# Patient Record
(demographics unavailable — no encounter records)

---

## 2024-11-05 NOTE — DISCUSSION/SUMMARY
[Medication Risks Reviewed] : Medication risks reviewed [de-identified] : reviewed the case and the imaging with her - healed compression fracture - still pain  not a surgical canddiate questions answered   discussed the knee situation and the xray looks okay - she could get an MRI and see knee service if needed  discussion that the flare ups may continue still has pain  We discussed lido patches. She deferred refill today Tylenol taken at night for pain in order to sleep  consider Lumbar/thoracic facets injections versus LESi  she can f/u for a TPi if needed  referral to pain management still prn   encourged HEP, consider aqua therapy, chair yoga?  otherwise f/u in 2  months

## 2024-11-05 NOTE — HISTORY OF PRESENT ILLNESS
[Lower back] : lower back [8] : 8 [Burning] : burning [Dull/Aching] : dull/aching [Tightness] : tightness [Frequent] : frequent [Sitting] : sitting [Lying in bed] : lying in bed [de-identified] : 4/24/23: 71 yo f here for the evaluation of her low back; reports injury on 4/18/23 where she fell into a manhole and fell down a few feet into a manhole; seen at [facility] where xrays done. Pain is to the low back and can shoot down both the right and the left leg. Some tingling into the right foot. Bruising to the right buttock.  No weakness. No loss of b/b control. She has been using a walker.  bruises on the left side bruises on the arm  No prior spine injuries/surgeries She has been taking tylenol  Hx hypothyroid  She takes Eliquis as she had SVT after covid - has a loop monitor  NO hx diabetes/cancer Has had DEXA  had a partial thyroidectomy oophrectomy  She is retired  xrays L-spine 4/18/23 - no fractures noted xrays today AP pelvis - no fractures noted   xrays today: T spine - no obvious fracture  L ribs - no obvious fracture L spine - possilbe compression fracture? AP PELVIS - possilbe ishcial tubersity crack on the right?  5/12/23: Here to review MRI - plan at last was "REVIEWED The case/imaging with her - MRI L spine indicated for COMPRESSION FRACTURE AT l3 - MRI PELVIS INDICATED FOR PELVIC FRACTURE - no obvious rib fracture - AVOID bending/lifting - FU TO REVIEW THE mrI - cont with walker" - after SHE saw me last the pain got a lot worse - now getting a little bit better - still cant sit comfortably   Had a fracture in the right foot - now in a boot and she is supposed to stay off as much as possible   xrays today: T spine - no new fractures noted L spine - compression deformities  L ribs - possilble hairline  MRI pelvis:  1. Focal areas of marrow edema with mild superior endplate depression on the right at L2 and L4 suspicious for mild recent compression fractures.. 2. Findings consistent with a subcutaneous soft tissue strain inferior medial to the right ischial tuberosity over an area measuring 5-6 cm without well-defined hematoma or mass. Clinical correlation and follow up is recommended. 3. Nonspecific mild-to-moderate free fluid in the pelvis posteriorly. Consider ultrasound of the pelvis to further evaluate as clinically indicated. 4. Mild bilateral gluteal tendinopathy and slight bilateral greater trochanteric bursitis right greater than left. 5. Symphysis pubis arthrosis. 6. Degenerative changes in the lower lumbar spine and small perineural cysts in the upper-to-mid sacral foramina which appear nonaggressive measuring up to 2 cm.  MRI L spine: 1. Focal areas of marrow edema with mild superior endplate depression on the right at L2 and L4 suspicious for  mild recent compression fractures. 2. Left foraminal herniation encroaches upon the left exiting L3 nerve root at L3-L4, right paracentral protrusion encroaches upon the right S1 nerve root at L5-S1 and there is multilevel degenerative disc disease with protrusions centrally at L1-L2 and L2-L3. 3. Mild convexity of the lumbar spine towards the left. 4. Small perineural cysts in the upper-to-mid sacral foramina measuring up to 2 cm which appear nonaggressive.  6/9/23 Patient presents today in follow up with acute flare of low back pain and BLE radicular pain. Patient currently not taking medications at this time.  Patient recent wearing Cam boot now able to WBAT (now in ankle support) and utilize walker reporting increased pain.   Patient also with episode of neck pain and stiffness.   Has PT coming to   xrays today: C spine- loss of disc hieght with spondylosis at C4-5 T spine - spondylosis L spine - no change in compression fracture alignment   7/14/23 Patient presents today in follow up with Hx of lumbar fxr after fall in [place]. Continues to wear LSO at this time. continues to have pain in neck and back, taking Flexeril prn.  Repeat imaging performed today-XRay  In PT for the foot now - just started with that   xrays today: Cspine - C4-5 spondylolisthesis with spondylosis/loss of disc hieght  Tspine - spondylosis  Lspine -  no loss of disc heigth   8/31/23: Here for f/u - No significant relief with PT.  Still pain in low back, has not been radiating down legs.  had a flare up of pain in the back of the shoulder blade on the left side   using brace on the back  using lumbar pillow  has been driving a bit   Has seen Dr Gonzalez for the foot - had EMG/NCS WHICH was negative   xrays today: l spine - no collapse of the vertebral bodies noted  10/2/23: Here to follow up. Plan at last was to start PT and if no improvement get MRI. Pain continue with no improvement. Wears brace. Doing PT at home   MRi L spine - healed compression fractures   11/27/23: Here to folllow up back pain. Worsening in the lower back pain  was going to PT but the price was too much as she has used up the allocatoin feels balance is off  Having more pain in the back   xrays today: Tspine - spondylosis  Lspine -  no loss of disc heigth, no collapse of the bones   1/8/24: here to follow up on lumbar spine. Attending PT with slight improvement. Noticed radiating pain down left leg since last week.  The pain in the lower back pain remains bad  Had to stop PT DUE TO pain flare up  took some tylenol   2/23/24: f/up L spine. Plan at last was MDP for acute flare-up.  Has flare ups that are scary for her - States she had dental work done since last visit and she took the MDP after, but it had really upset her stomach. Pain remains. States it is intermittent but is bothersome daily. Having difficulty with sleeping due to pain.   7/15/24: Here to follow up back pain. Pain continues, worse with activity. No sig changes. PT helped with strength but did not help with pain.   seeing psych for PTSD  09/16/2024 Patient is following up on lower back. States she saw pain management.   Pain remains in the back Had a flare up  saw Dr Hernandez   xrays today: L knee - no severe OA, MILD pf NARROWING   11/04/24: pt is here for follow up on lower back, states she still has some pain, only has relief when laying in bed or couch. states she feels ''discouraged'' due to the pain. [] : no [FreeTextEntry5] : took MDP with side effects, discontinued- heartburn, gastro issues. continuing PT.  [de-identified] : physical therapy

## 2024-11-05 NOTE — PHYSICAL EXAM
[Left lower extremity below knee] : left lower extremity below knee [Left lower extremity above knee] : left lower extremity above knee [] : mildly antalgic [FreeTextEntry9] : slight improvement since prior visit

## 2024-11-05 NOTE — DISCUSSION/SUMMARY
[de-identified] : home exercises and low impact activity recommended. HEP encouraged f/u 2-3 months

## 2024-11-05 NOTE — DISCUSSION/SUMMARY
[Medication Risks Reviewed] : Medication risks reviewed [de-identified] : reviewed the case and the imaging with her - healed compression fracture - still pain  not a surgical canddiate questions answered   discussed the knee situation and the xray looks okay - she could get an MRI and see knee service if needed  discussion that the flare ups may continue still has pain  We discussed lido patches. She deferred refill today Tylenol taken at night for pain in order to sleep  consider Lumbar/thoracic facets injections versus LESi  she can f/u for a TPi if needed  referral to pain management still prn   encourged HEP, consider aqua therapy, chair yoga?  otherwise f/u in 2  months

## 2024-11-05 NOTE — HISTORY OF PRESENT ILLNESS
[Right Leg] : right leg [Sudden] : sudden [5] : 5 [2] : 2 [Dull/Aching] : dull/aching [Sharp] : sharp [Frequent] : frequent [Meds] : meds [de-identified] :  4/25/23: This is Ms. JOSE FRITZ  a 70 year old female who comes in today complaining of right ankle pain since 4/18/23 when she fell into a dysfunctional manhole in [place].  SHe is seeing Dr Vang for compression fx at L3 and possible pelvic fx.  Pain is to the lateral ankle with weightbearing.  No prior right ankle issues. Seen at [facility] where xrays done.  WBAT in brace. She is taking tylenol.   05/04/23: Pt is here to review MRI result. Pt tried to sleep without the brace, pain increase so she put the brace back on, felt better. Using walker 05/30/23: f/u R ankle cam boot/walker; NWB in CAM boot, feels much better  07/18/2023: Follow up on right ankle. Wb in airsport. states she has been having swelling for 2-3 weeks had a Doppler done on 6/21/23 which was negative for DVT.  She does not feel steady on her feet and notices fatigue and shaking of the ankle. Unable to swim/kick. Having heel pain. 08/10/2023: EMG Results. Toe mobility improved.   9/28/23: f/u R ankle.  She continues to wear airsport. She was not able to wean with pain.  She feels posterior heel pain, throbbing nature.  She is making slow gains with PT. RLE pain from back down.  RLE weakness. 10/31/23: FU Right ankle. Pain stayed the same since last visit. Pt states her gait is "choppy" .Saw Dr. Vang, he recommended her to go to a PT facility instead of home therapy.  Balance worse a night 12/05/23: follow up on right ankle. PT 1- 2 a week. Notes balance and her gait is still bad. She is still limping.  Occ sharp pain to the lateral ankle.  02/22/23: f/u right ankle. States 3 weeks ago in PT she was doing the bands and since than she is having pain to the lateral foot. 04/02/24: follow up on right ankle. States she would to discuss orthotics. Wb in sneakers.  05/21/2024 Patient is following up on right ankle. Pain has moved up in the ankle radiating up the leg. WB slides Having Spine issues 07/30/2024 Patient is following up on right ankle. States she gets pain after activities approx 10 minutes (bck issues). Wb in sneakers 09/17/2024: follow Up on right ankle. States pain/atigue increases with activities.  11/05/2024: follow up on right ankle. Pain continues to the lateral ankle despite HEP. Having back and knee issues. Tylenol as needed. PT caused more pain.  [] : no [FreeTextEntry1] : ankle [FreeTextEntry3] : 4/18/23 [FreeTextEntry9] : tylenol [de-identified] : ER in [place]

## 2024-11-05 NOTE — HISTORY OF PRESENT ILLNESS
[Lower back] : lower back [8] : 8 [Burning] : burning [Dull/Aching] : dull/aching [Tightness] : tightness [Frequent] : frequent [Sitting] : sitting [Lying in bed] : lying in bed [de-identified] : 4/24/23: 69 yo f here for the evaluation of her low back; reports injury on 4/18/23 where she fell into a manhole and fell down a few feet into a manhole; seen at [facility] where xrays done. Pain is to the low back and can shoot down both the right and the left leg. Some tingling into the right foot. Bruising to the right buttock.  No weakness. No loss of b/b control. She has been using a walker.  bruises on the left side bruises on the arm  No prior spine injuries/surgeries She has been taking tylenol  Hx hypothyroid  She takes Eliquis as she had SVT after covid - has a loop monitor  NO hx diabetes/cancer Has had DEXA  had a partial thyroidectomy oophrectomy  She is retired  xrays L-spine 4/18/23 - no fractures noted xrays today AP pelvis - no fractures noted   xrays today: T spine - no obvious fracture  L ribs - no obvious fracture L spine - possilbe compression fracture? AP PELVIS - possilbe ishcial tubersity crack on the right?  5/12/23: Here to review MRI - plan at last was "REVIEWED The case/imaging with her - MRI L spine indicated for COMPRESSION FRACTURE AT l3 - MRI PELVIS INDICATED FOR PELVIC FRACTURE - no obvious rib fracture - AVOID bending/lifting - FU TO REVIEW THE mrI - cont with walker" - after SHE saw me last the pain got a lot worse - now getting a little bit better - still cant sit comfortably   Had a fracture in the right foot - now in a boot and she is supposed to stay off as much as possible   xrays today: T spine - no new fractures noted L spine - compression deformities  L ribs - possilble hairline  MRI pelvis:  1. Focal areas of marrow edema with mild superior endplate depression on the right at L2 and L4 suspicious for mild recent compression fractures.. 2. Findings consistent with a subcutaneous soft tissue strain inferior medial to the right ischial tuberosity over an area measuring 5-6 cm without well-defined hematoma or mass. Clinical correlation and follow up is recommended. 3. Nonspecific mild-to-moderate free fluid in the pelvis posteriorly. Consider ultrasound of the pelvis to further evaluate as clinically indicated. 4. Mild bilateral gluteal tendinopathy and slight bilateral greater trochanteric bursitis right greater than left. 5. Symphysis pubis arthrosis. 6. Degenerative changes in the lower lumbar spine and small perineural cysts in the upper-to-mid sacral foramina which appear nonaggressive measuring up to 2 cm.  MRI L spine: 1. Focal areas of marrow edema with mild superior endplate depression on the right at L2 and L4 suspicious for  mild recent compression fractures. 2. Left foraminal herniation encroaches upon the left exiting L3 nerve root at L3-L4, right paracentral protrusion encroaches upon the right S1 nerve root at L5-S1 and there is multilevel degenerative disc disease with protrusions centrally at L1-L2 and L2-L3. 3. Mild convexity of the lumbar spine towards the left. 4. Small perineural cysts in the upper-to-mid sacral foramina measuring up to 2 cm which appear nonaggressive.  6/9/23 Patient presents today in follow up with acute flare of low back pain and BLE radicular pain. Patient currently not taking medications at this time.  Patient recent wearing Cam boot now able to WBAT (now in ankle support) and utilize walker reporting increased pain.   Patient also with episode of neck pain and stiffness.   Has PT coming to   xrays today: C spine- loss of disc hieght with spondylosis at C4-5 T spine - spondylosis L spine - no change in compression fracture alignment   7/14/23 Patient presents today in follow up with Hx of lumbar fxr after fall in [place]. Continues to wear LSO at this time. continues to have pain in neck and back, taking Flexeril prn.  Repeat imaging performed today-XRay  In PT for the foot now - just started with that   xrays today: Cspine - C4-5 spondylolisthesis with spondylosis/loss of disc hieght  Tspine - spondylosis  Lspine -  no loss of disc heigth   8/31/23: Here for f/u - No significant relief with PT.  Still pain in low back, has not been radiating down legs.  had a flare up of pain in the back of the shoulder blade on the left side   using brace on the back  using lumbar pillow  has been driving a bit   Has seen Dr Gonzalez for the foot - had EMG/NCS WHICH was negative   xrays today: l spine - no collapse of the vertebral bodies noted  10/2/23: Here to follow up. Plan at last was to start PT and if no improvement get MRI. Pain continue with no improvement. Wears brace. Doing PT at home   MRi L spine - healed compression fractures   11/27/23: Here to folllow up back pain. Worsening in the lower back pain  was going to PT but the price was too much as she has used up the allocatoin feels balance is off  Having more pain in the back   xrays today: Tspine - spondylosis  Lspine -  no loss of disc heigth, no collapse of the bones   1/8/24: here to follow up on lumbar spine. Attending PT with slight improvement. Noticed radiating pain down left leg since last week.  The pain in the lower back pain remains bad  Had to stop PT DUE TO pain flare up  took some tylenol   2/23/24: f/up L spine. Plan at last was MDP for acute flare-up.  Has flare ups that are scary for her - States she had dental work done since last visit and she took the MDP after, but it had really upset her stomach. Pain remains. States it is intermittent but is bothersome daily. Having difficulty with sleeping due to pain.   7/15/24: Here to follow up back pain. Pain continues, worse with activity. No sig changes. PT helped with strength but did not help with pain.   seeing psych for PTSD  09/16/2024 Patient is following up on lower back. States she saw pain management.   Pain remains in the back Had a flare up  saw Dr Hernandez   xrays today: L knee - no severe OA, MILD pf NARROWING   11/04/24: pt is here for follow up on lower back, states she still has some pain, only has relief when laying in bed or couch. states she feels ''discouraged'' due to the pain. [] : no [FreeTextEntry5] : took MDP with side effects, discontinued- heartburn, gastro issues. continuing PT.  [de-identified] : physical therapy

## 2024-11-05 NOTE — PHYSICAL EXAM
[Able to Communicate] : able to communicate [Well Developed] : well developed [Well Nourished] : well nourished [Right] : right foot and ankle [Mild] : mild diffused ankle swelling [NL (40)] : plantar flexion 40 degrees [5___] : eversion 5[unfilled]/5 [2+] : dorsalis pedis pulse: 2+ [] : negative anterior drawer at ankle [de-identified] : balance [TWNoteComboBox7] : dorsiflexion 15 degrees [de-identified] : inversion 20 degrees [de-identified] : eversion 15 degrees

## 2025-01-06 NOTE — PROCEDURE
[Trigger point 1-2 muscle groups] : trigger point 1-2 muscle groups [Lumbar paraspinal muscle] : lumbar paraspinal muscle [Pain] : pain [Alcohol] : alcohol [Betadine] : betadine [Ethyl Chloride sprayed topically] : ethyl chloride sprayed topically [___ cc    1%] : Lidocaine ~Vcc of 1%  [___ cc    0.25%] : Bupivacaine (Marcaine) ~Vcc of 0.25%  [___ cc    10mg] : Triamcinolone (Kenalog) ~Vcc of 10 mg  [Risks, benefits, alternatives discussed / Verbal consent obtained] : the risks benefits, and alternatives have been discussed, and verbal consent was obtained [FreeTextEntry3] : the pain 8 before and 4 after the injection

## 2025-01-06 NOTE — PHYSICAL EXAM
[Left lower extremity below knee] : left lower extremity below knee [Left lower extremity above knee] : left lower extremity above knee [] : mildly antalgic [Right] : right shoulder [FreeTextEntry9] : slight improvement since prior visit [FreeTextEntry3] : pain with ROM of the right shoulder

## 2025-01-06 NOTE — DISCUSSION/SUMMARY
[Medication Risks Reviewed] : Medication risks reviewed [de-identified] : reviewed the case and the imaging with her - healed compression fracture - REMAINS in significant pain questions answered   discussed the knee situation - can fu with knee service  she is pending another knee MRI   discussion that the flare ups may continue still has pain  We discussed lido patches. She deferred refill today Tylenol taken at night for pain in order to sleep  consider Lumbar/thoracic facets injections versus LESi  she can f/u for a TPi if needed  referral to pain management still prn   encourged HEP, consider aqua therapy, chair yoga?  otherwise f/u in 2  months

## 2025-01-06 NOTE — HISTORY OF PRESENT ILLNESS
[Lower back] : lower back [8] : 8 [Burning] : burning [Dull/Aching] : dull/aching [Tightness] : tightness [Frequent] : frequent [Sitting] : sitting [Lying in bed] : lying in bed [de-identified] : 4/24/23: 69 yo f here for the evaluation of her low back; reports injury on 4/18/23 where she fell into a manhole and fell down a few feet into a manhole; seen at [facility] where xrays done. Pain is to the low back and can shoot down both the right and the left leg. Some tingling into the right foot. Bruising to the right buttock.  No weakness. No loss of b/b control. She has been using a walker.  bruises on the left side bruises on the arm  No prior spine injuries/surgeries She has been taking tylenol  Hx hypothyroid  She takes Eliquis as she had SVT after covid - has a loop monitor  NO hx diabetes/cancer Has had DEXA  had a partial thyroidectomy oophrectomy  She is retired  xrays L-spine 4/18/23 - no fractures noted xrays today AP pelvis - no fractures noted   xrays today: T spine - no obvious fracture  L ribs - no obvious fracture L spine - possilbe compression fracture? AP PELVIS - possilbe ishcial tubersity crack on the right?  5/12/23: Here to review MRI - plan at last was "REVIEWED The case/imaging with her - MRI L spine indicated for COMPRESSION FRACTURE AT l3 - MRI PELVIS INDICATED FOR PELVIC FRACTURE - no obvious rib fracture - AVOID bending/lifting - FU TO REVIEW THE mrI - cont with walker" - after SHE saw me last the pain got a lot worse - now getting a little bit better - still cant sit comfortably   Had a fracture in the right foot - now in a boot and she is supposed to stay off as much as possible   xrays today: T spine - no new fractures noted L spine - compression deformities  L ribs - possilble hairline  MRI pelvis:  1. Focal areas of marrow edema with mild superior endplate depression on the right at L2 and L4 suspicious for mild recent compression fractures.. 2. Findings consistent with a subcutaneous soft tissue strain inferior medial to the right ischial tuberosity over an area measuring 5-6 cm without well-defined hematoma or mass. Clinical correlation and follow up is recommended. 3. Nonspecific mild-to-moderate free fluid in the pelvis posteriorly. Consider ultrasound of the pelvis to further evaluate as clinically indicated. 4. Mild bilateral gluteal tendinopathy and slight bilateral greater trochanteric bursitis right greater than left. 5. Symphysis pubis arthrosis. 6. Degenerative changes in the lower lumbar spine and small perineural cysts in the upper-to-mid sacral foramina which appear nonaggressive measuring up to 2 cm.  MRI L spine: 1. Focal areas of marrow edema with mild superior endplate depression on the right at L2 and L4 suspicious for  mild recent compression fractures. 2. Left foraminal herniation encroaches upon the left exiting L3 nerve root at L3-L4, right paracentral protrusion encroaches upon the right S1 nerve root at L5-S1 and there is multilevel degenerative disc disease with protrusions centrally at L1-L2 and L2-L3. 3. Mild convexity of the lumbar spine towards the left. 4. Small perineural cysts in the upper-to-mid sacral foramina measuring up to 2 cm which appear nonaggressive.  6/9/23 Patient presents today in follow up with acute flare of low back pain and BLE radicular pain. Patient currently not taking medications at this time.  Patient recent wearing Cam boot now able to WBAT (now in ankle support) and utilize walker reporting increased pain.   Patient also with episode of neck pain and stiffness.   Has PT coming to   xrays today: C spine- loss of disc hieght with spondylosis at C4-5 T spine - spondylosis L spine - no change in compression fracture alignment   7/14/23 Patient presents today in follow up with Hx of lumbar fxr after fall in Manhole. Continues to wear LSO at this time. continues to have pain in neck and back, taking Flexeril prn.  Repeat imaging performed today-XRay  In PT for the foot now - just started with that   xrays today: Cspine - C4-5 spondylolisthesis with spondylosis/loss of disc hieght  Tspine - spondylosis  Lspine -  no loss of disc heigth   8/31/23: Here for f/u - No significant relief with PT.  Still pain in low back, has not been radiating down legs.  had a flare up of pain in the back of the shoulder blade on the left side   using brace on the back  using lumbar pillow  has been driving a bit   Has seen Dr Gonzalez for the foot - had EMG/NCS WHICH was negative   xrays today: l spine - no collapse of the vertebral bodies noted  10/2/23: Here to follow up. Plan at last was to start PT and if no improvement get MRI. Pain continue with no improvement. Wears brace. Doing PT at home   MRi L spine - healed compression fractures   11/27/23: Here to folllow up back pain. Worsening in the lower back pain  was going to PT but the price was too much as she has used up the allocatoin feels balance is off  Having more pain in the back   xrays today: Tspine - spondylosis  Lspine -  no loss of disc heigth, no collapse of the bones   1/8/24: here to follow up on lumbar spine. Attending PT with slight improvement. Noticed radiating pain down left leg since last week.  The pain in the lower back pain remains bad  Had to stop PT DUE TO pain flare up  took some tylenol   2/23/24: f/up L spine. Plan at last was MDP for acute flare-up.  Has flare ups that are scary for her - States she had dental work done since last visit and she took the MDP after, but it had really upset her stomach. Pain remains. States it is intermittent but is bothersome daily. Having difficulty with sleeping due to pain.   7/15/24: Here to follow up back pain. Pain continues, worse with activity. No sig changes. PT helped with strength but did not help with pain.   seeing psych for PTSD  09/16/2024 Patient is following up on lower back. States she saw pain management.   Pain remains in the back Had a flare up  saw Dr Hernandez   xrays today: L knee - no severe OA, MILD pf NARROWING   11/04/24: pt is here for follow up on lower back, states she still has some pain, only has relief when laying in bed or couch. states she feels ''discouraged'' due to the pain.  1/6/25: Here with increased pain to right leg and back. Severe at times. Unalbe to walk due to pain. Pain radiates to right leg and locatlized in knee at times. Under the care of Dr lewis with cortisone injection no relief. Knee buckling  Had a knee injectoin whick helped the knee  HAVING MORE pain in the right shoulder as well   xrays today: T spine - spondylosis L spine - no change in the compressoin deformity  AP PELVIS- no severE oa   [] : no [FreeTextEntry5] : Here for a follow up of the lower back [de-identified] : physical therapy

## 2025-01-23 NOTE — HISTORY OF PRESENT ILLNESS
[de-identified] :  4/25/23: This is Ms. JOSE FRITZ  a 70 year old female who comes in today complaining of right ankle pain since 4/18/23 when she fell into a dysfunctional manhole in [place].  SHe is seeing Dr Vang for compression fx at L3 and possible pelvic fx.  Pain is to the lateral ankle with weightbearing.  No prior right ankle issues. Seen at [facility] where xrays done.  WBAT in brace. She is taking tylenol.   05/04/23: Pt is here to review MRI result. Pt tried to sleep without the brace, pain increase so she put the brace back on, felt better. Using walker 05/30/23: f/u R ankle cam boot/walker; NWB in CAM boot, feels much better  07/18/2023: Follow up on right ankle. Wb in airsport. states she has been having swelling for 2-3 weeks had a Doppler done on 6/21/23 which was negative for DVT.  She does not feel steady on her feet and notices fatigue and shaking of the ankle. Unable to swim/kick. Having heel pain. 08/10/2023: EMG Results. Toe mobility improved.   9/28/23: f/u R ankle.  She continues to wear airsport. She was not able to wean with pain.  She feels posterior heel pain, throbbing nature.  She is making slow gains with PT. RLE pain from back down.  RLE weakness. 10/31/23: FU Right ankle. Pain stayed the same since last visit. Pt states her gait is "choppy" .Saw Dr. aVng, he recommended her to go to a PT facility instead of home therapy.  Balance worse a night 12/05/23: follow up on right ankle. PT 1- 2 a week. Notes balance and her gait is still bad. She is still limping.  Occ sharp pain to the lateral ankle.  02/22/23: f/u right ankle. States 3 weeks ago in PT she was doing the bands and since than she is having pain to the lateral foot. 04/02/24: follow up on right ankle. States she would to discuss orthotics. Wb in sneakers.  05/21/2024 Patient is following up on right ankle. Pain has moved up in the ankle radiating up the leg. WB slides Having Spine issues 07/30/2024 Patient is following up on right ankle. States she gets pain after activities approx 10 minutes (bck issues). Wb in sneakers 09/17/2024: follow Up on right ankle. States pain/atigue increases with activities.  11/05/2024: follow up on right ankle. Pain continues to the lateral ankle despite HEP. Having back and knee issues. Tylenol as needed. PT caused more pain.  1/23/25: f/u R ankle; worsening pain to the ankle due to bilateral knee pain. Recent CSIs to both knees and visco to the left knee. with Dr. Littlejohn Favored left side. PT helpful. Also had TPI  with Dr Vang in the lower back with some relief.  Feels weakness to the right ankle and the ankle is turning in while she is walking.

## 2025-01-23 NOTE — PHYSICAL EXAM
[Able to Communicate] : able to communicate [Well Developed] : well developed [Well Nourished] : well nourished [Right] : right foot and ankle [Mild] : mild diffused ankle swelling [NL (40)] : plantar flexion 40 degrees [5___] : eversion 5[unfilled]/5 [2+] : dorsalis pedis pulse: 2+ [] : negative anterior drawer at ankle [de-identified] : balance [TWNoteComboBox7] : dorsiflexion 15 degrees [de-identified] : inversion 20 degrees [de-identified] : eversion 15 degrees

## 2025-02-17 NOTE — HISTORY OF PRESENT ILLNESS
[Lower back] : lower back [8] : 8 [Burning] : burning [Dull/Aching] : dull/aching [Tightness] : tightness [Frequent] : frequent [Sitting] : sitting [Lying in bed] : lying in bed [de-identified] :  2/17/25: Here for follow up low back. pain is still radiaitng down leg to right calf.  Had temp relief with the TPI but symptoms recurred  Having a hard time walking   [] : no [FreeTextEntry5] : Here for a follow up of the lower back [de-identified] : physical therapy

## 2025-02-17 NOTE — DISCUSSION/SUMMARY
[Medication Risks Reviewed] : Medication risks reviewed [de-identified] : reviewed the case and the imaging with her - healed compression fracture - REMAINS in significant pain - problems walking   discussed the knee situation - can fu with knee service   discussion that the flare ups may continue still has pain  We discussed lido patches. She deferred refill today Tylenol taken at night for pain in order to sleep  consider Lumbar/thoracic facets injections versus LESi  she can f/u for a TPi if needed   otherwise f/u in 2  months

## 2025-03-04 NOTE — DISCUSSION/SUMMARY
[de-identified] : Pt is a pleasant 72 year old female, with right knee pain secondary to osteoarthritis. A lengthy discussion was held regarding the patient's condition and treatment options including all risks, benefits, prognosis and outcomes of each were discussed in detail. Surgery was discussed as an option in the future, if needed. Discussed w/ patient importance of BMI/weight reduction, with goal of 10-20 lb, patient reports she has been losing weight. This will be through a combination of diet and a low impact exercise program swimming, stationary bike vs peloton, elliptical, walking w/ weights, etc. Patient already has supportive laced shoes and was recommended to obtain and over the counter Knee sleeve w/ hole for additional support. NSAIDs + Tylenol PRN (Aleve/Tylenol), stomach precautions advised and will Ice 2-3x day for 10-15 min.  A new PT script was provided today.  The patient expressed understanding and all questions were answered. The patient will contact me on how she is doing otherwise follow up will be in 8 weeks.

## 2025-03-04 NOTE — PROCEDURE
[de-identified] : After careful discussion regarding the risks versus benefits of a corticosteroid injection the patient has elected to proceed with that treatment. Under sterile conditions, the patient received a right knee joint injection with 8 cc of half percent Marcaine without epinephrine and 2 cc of Betamethasone for a total of 12 mg. The site was cleaned and a bandaid was applied. The patient tolerated the injection without problem. LOT #: [id number] EXP: 10/26/25

## 2025-03-04 NOTE — CONSULT LETTER
[FreeTextEntry1] : I had the pleasure of evaluating your patient in the office today for right knee osteoarthritis. I have enclosed a copy of today's office notes for your charts and for your review.  Sincerely,   Can Stewart M.D. Professor and  Department of Orthopedic Surgery [facility]

## 2025-03-04 NOTE — PHYSICAL EXAM
[LE] : Sensory: Intact in bilateral lower extremities [Knee] : patellar 2+ and symmetric bilaterally [PT] : posterior tibial 2+ and symmetric bilaterally [de-identified] : Pt is a pleasant 72 year old female, AAOx3 with no apparent distress, 27.4BMI. Physical examination of right knee reveals normal contours with no deformity, skin intact, with no signs of infection, no erythema, no swelling, no discoloration, no distal lymphedema, no patholaxity, no muscle atrophy noted. ROM of right knee reveals flex/ext: 3-125. Motor strength 5/5 throughout the arc of motion. Able to SLR with good core strength. Medial joint line TTP. No neurological deficits noted. [de-identified] : X-rays were ordered, obtained and interpreted today 3/4/25: 4 views of the right knee demonstrate mild medial joint space narrowing, patellofemoral moderate joint space narrowing and spurring, sclerosis, with no acute fracture or dislocation.

## 2025-03-04 NOTE — HISTORY OF PRESENT ILLNESS
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 She denies any injury or trauma. She was seen by Dr. Rd Coppola and was treated with cortisone injection and GEL injection which did not really help. The pain is a 3/10 at this visit and can get to an 8/10 at the worse. She only takes Tylenol. Her  who is a therapist and but the last few sessions the knee has been sore. The pain does bother her at night. She denies any numbness or tingling.  Hx: Hypothyroid, Heart Burn, HLD, Depression

## 2025-04-15 NOTE — PHYSICAL EXAM
[Able to Communicate] : able to communicate [Well Developed] : well developed [Well Nourished] : well nourished [Right] : right foot and ankle [Mild] : mild diffused ankle swelling [NL (40)] : plantar flexion 40 degrees [5___] : eversion 5[unfilled]/5 [2+] : dorsalis pedis pulse: 2+ [] : ambulation with cane [de-identified] : balance [TWNoteComboBox7] : dorsiflexion 15 degrees [de-identified] : inversion 20 degrees [de-identified] : eversion 15 degrees

## 2025-04-15 NOTE — HISTORY OF PRESENT ILLNESS
[de-identified] :  4/25/23: This is Ms. JOSE FRITZ  a 70 year old female who comes in today complaining of right ankle pain since 4/18/23 when she fell into a dysfunctional manhole in [place].  SHe is seeing Dr Vang for compression fx at L3 and possible pelvic fx.  Pain is to the lateral ankle with weightbearing.  No prior right ankle issues. Seen at [facility] where xrays done.  WBAT in brace. She is taking tylenol.   05/04/23: Pt is here to review MRI result. Pt tried to sleep without the brace, pain increase so she put the brace back on, felt better. Using walker 05/30/23: f/u R ankle cam boot/walker; NWB in CAM boot, feels much better  07/18/2023: Follow up on right ankle. Wb in airsport. states she has been having swelling for 2-3 weeks had a Doppler done on 6/21/23 which was negative for DVT.  She does not feel steady on her feet and notices fatigue and shaking of the ankle. Unable to swim/kick. Having heel pain. 08/10/2023: EMG Results. Toe mobility improved.   9/28/23: f/u R ankle.  She continues to wear airsport. She was not able to wean with pain.  She feels posterior heel pain, throbbing nature.  She is making slow gains with PT. RLE pain from back down.  RLE weakness. 10/31/23: FU Right ankle. Pain stayed the same since last visit. Pt states her gait is "choppy" .Saw Dr. Vang, he recommended her to go to a PT facility instead of home therapy.  Balance worse a night 12/05/23: follow up on right ankle. PT 1- 2 a week. Notes balance and her gait is still bad. She is still limping.  Occ sharp pain to the lateral ankle.  02/22/23: f/u right ankle. States 3 weeks ago in PT she was doing the bands and since than she is having pain to the lateral foot. 04/02/24: follow up on right ankle. States she would to discuss orthotics. Wb in sneakers.  05/21/2024 Patient is following up on right ankle. Pain has moved up in the ankle radiating up the leg. WB slides Having Spine issues 07/30/2024 Patient is following up on right ankle. States she gets pain after activities approx 10 minutes (bck issues). Wb in sneakers 09/17/2024: follow Up on right ankle. States pain/atigue increases with activities.  11/05/2024: follow up on right ankle. Pain continues to the lateral ankle despite HEP. Having back and knee issues. Tylenol as needed. PT caused more pain.  1/23/25: f/u R ankle; worsening pain to the ankle due to bilateral knee pain. Recent CSIs to both knees and visco to the left knee. with Dr. Littlejohn Favored left side. PT helpful. Also had TPI  with Dr Vang in the lower back with some relief.  Feels weakness to the right ankle and the ankle is turning in while she is walking.  04.15.25: Patient is here today following up on the right ankle. . Pt started aqua therapy. 25 minute ok. 1 hour and more sore today Also notes R knee pain

## 2025-04-17 NOTE — PHYSICAL EXAM
[Left lower extremity below knee] : left lower extremity below knee [Left lower extremity above knee] : left lower extremity above knee [] : mildly antalgic [Right] : right shoulder [Right lower extremity below knee] : right lower extremity below knee [Right lower extremity above knee] : right lower extremity above knee [FreeTextEntry9] : slight improvement since prior visit [de-identified] : weakness in the right thigh  [FreeTextEntry3] : pain with ROM of the right shoulder

## 2025-04-17 NOTE — DISCUSSION/SUMMARY
[Medication Risks Reviewed] : Medication risks reviewed [de-identified] : reviewed the case and the imaging with her - healed compression fracture - REMAINS in significant pain - problems walking   discussed the knee situation - can fu with knee service   weakness in the right leg now   discussion that the flare ups may continue still has pain  We discussed lido patches. Tylenol taken at night for pain in order to sleep  consider Lumbar/thoracic facets injections versus LESi  she can f/u for a TPi if needed   otherwise f/u in 2  months

## 2025-04-17 NOTE — HISTORY OF PRESENT ILLNESS
[Lower back] : lower back [8] : 8 [Burning] : burning [Dull/Aching] : dull/aching [Tightness] : tightness [Frequent] : frequent [Sitting] : sitting [Lying in bed] : lying in bed [de-identified] : 4/17/25: Here for follow up on lower back. in pain. taking extra strength Tylenol at night with some relief. Saw Dr. Gonzalez and he believes all pain in right leg coming from back - kinetic chain.  Was advised to speak with me about me about it   Tried acutherapy, made the pain worse.   using cane to get around   [] : no [FreeTextEntry5] : Here for a follow up of the lower back [de-identified] : physical therapy

## 2025-06-03 NOTE — DISCUSSION/SUMMARY
[de-identified] : Cont PT- aqua Spine f/u /Pain- saw Dr. Vang- trigger point less relief, considering LESI Knee f/u

## 2025-06-03 NOTE — PHYSICAL EXAM
[Able to Communicate] : able to communicate [Well Developed] : well developed [Well Nourished] : well nourished [Right] : right foot and ankle [Mild] : mild diffused ankle swelling [NL (40)] : plantar flexion 40 degrees [5___] : eversion 5[unfilled]/5 [2+] : dorsalis pedis pulse: 2+ [] : negative anterior drawer at ankle [de-identified] : balance [TWNoteComboBox7] : dorsiflexion 15 degrees [de-identified] : inversion 20 degrees [de-identified] : eversion 15 degrees

## 2025-06-03 NOTE — HISTORY OF PRESENT ILLNESS
Medical message sent to patient  Kasey WENDY Silver RN on 1/4/2023 at 10:53 AM     [de-identified] :  4/25/23: This is Ms. JOSE FRITZ  a 70 year old female who comes in today complaining of right ankle pain since 4/18/23 when she fell into a dysfunctional manhole in [place].  SHe is seeing Dr Vang for compression fx at L3 and possible pelvic fx.  Pain is to the lateral ankle with weightbearing.  No prior right ankle issues. Seen at [facility] where xrays done.  WBAT in brace. She is taking tylenol.   05/04/23: Pt is here to review MRI result. Pt tried to sleep without the brace, pain increase so she put the brace back on, felt better. Using walker 05/30/23: f/u R ankle cam boot/walker; NWB in CAM boot, feels much better  07/18/2023: Follow up on right ankle. Wb in airsport. states she has been having swelling for 2-3 weeks had a Doppler done on 6/21/23 which was negative for DVT.  She does not feel steady on her feet and notices fatigue and shaking of the ankle. Unable to swim/kick. Having heel pain. 08/10/2023: EMG Results. Toe mobility improved.   9/28/23: f/u R ankle.  She continues to wear airsport. She was not able to wean with pain.  She feels posterior heel pain, throbbing nature.  She is making slow gains with PT. RLE pain from back down.  RLE weakness. 10/31/23: FU Right ankle. Pain stayed the same since last visit. Pt states her gait is "choppy" .Saw Dr. Vang, he recommended her to go to a PT facility instead of home therapy.  Balance worse a night 12/05/23: follow up on right ankle. PT 1- 2 a week. Notes balance and her gait is still bad. She is still limping.  Occ sharp pain to the lateral ankle.  02/22/23: f/u right ankle. States 3 weeks ago in PT she was doing the bands and since than she is having pain to the lateral foot. 04/02/24: follow up on right ankle. States she would to discuss orthotics. Wb in sneakers.  05/21/2024 Patient is following up on right ankle. Pain has moved up in the ankle radiating up the leg. WB slides Having Spine issues 07/30/2024 Patient is following up on right ankle. States she gets pain after activities approx 10 minutes (bck issues). Wb in sneakers 09/17/2024: follow Up on right ankle. States pain/atigue increases with activities.  11/05/2024: follow up on right ankle. Pain continues to the lateral ankle despite HEP. Having back and knee issues. Tylenol as needed. PT caused more pain.  1/23/25: f/u R ankle; worsening pain to the ankle due to bilateral knee pain. Recent CSIs to both knees and visco to the left knee. with Dr. Littlejohn Favored left side. PT helpful. Also had TPI  with Dr Vang in the lower back with some relief.  Feels weakness to the right ankle and the ankle is turning in while she is walking.  04.15.25: Patient is here today following up on the right ankle. . Pt started aqua therapy. 25 minute ok. 1 hour and more sore today Also notes R knee pain 06.03.25: Patient is following up on the R ankle pain. Pain is worse from last visit. Pain is described as a electric that radiates from the L spine to the foot.

## 2025-06-10 NOTE — DISCUSSION/SUMMARY
[Medication Risks Reviewed] : Medication risks reviewed [de-identified] : reviewed the case - healed compression fracture - REMAINS in significant pain - problems walking with pain shooting down the right leg will be getting updated MRI L-spine and MRI of the right knee - ordered by pain management (Dr Andrade) she will bring imaging in next visit for review  discussion that the flare ups may continue still has pain  We discussed voltaren gel - lidocaine patches not working tylenol as needed  f/u 2-3 months

## 2025-06-10 NOTE — HISTORY OF PRESENT ILLNESS
[Lower back] : lower back [8] : 8 [Burning] : burning [Dull/Aching] : dull/aching [Tightness] : tightness [Frequent] : frequent [Sitting] : sitting [Lying in bed] : lying in bed [de-identified] : 4/17/25: Here for follow up on lower back. in pain. taking extra strength Tylenol at night with some relief. Saw Dr. Gonzalez and he believes all pain in right leg coming from back - kinetic chain.  Was advised to speak with me about me about it   Tried acutherapy, made the pain worse.   using cane to get around   6/9/25: Here for fu on the lower back pain; continued pain shooting into the right leg; Following pain management (Dr Andrade) and will be getting new MRI L-spine and MRI of the right knee.  She continues to take extra strength tylenol. Still with difficulty ambulating.  [] : no [FreeTextEntry5] : Here for a follow up of the lower back [de-identified] : physical therapy

## 2025-06-10 NOTE — PHYSICAL EXAM
[Right lower extremity below knee] : right lower extremity below knee [Right lower extremity above knee] : right lower extremity above knee [] : mildly antalgic [FreeTextEntry9] : slight improvement since prior visit [de-identified] : weakness in the right thigh

## 2025-06-10 NOTE — PHYSICAL EXAM
[Right lower extremity below knee] : right lower extremity below knee [Right lower extremity above knee] : right lower extremity above knee [] : mildly antalgic [FreeTextEntry9] : slight improvement since prior visit [de-identified] : weakness in the right thigh

## 2025-06-10 NOTE — DISCUSSION/SUMMARY
[Medication Risks Reviewed] : Medication risks reviewed [de-identified] : reviewed the case - healed compression fracture - REMAINS in significant pain - problems walking with pain shooting down the right leg will be getting updated MRI L-spine and MRI of the right knee - ordered by pain management (Dr Andrade) she will bring imaging in next visit for review  discussion that the flare ups may continue still has pain  We discussed voltaren gel - lidocaine patches not working tylenol as needed  f/u 2-3 months

## 2025-06-10 NOTE — HISTORY OF PRESENT ILLNESS
[Lower back] : lower back [8] : 8 [Burning] : burning [Dull/Aching] : dull/aching [Tightness] : tightness [Frequent] : frequent [Sitting] : sitting [Lying in bed] : lying in bed [de-identified] : 4/17/25: Here for follow up on lower back. in pain. taking extra strength Tylenol at night with some relief. Saw Dr. Gonzalez and he believes all pain in right leg coming from back - kinetic chain.  Was advised to speak with me about me about it   Tried acutherapy, made the pain worse.   using cane to get around   6/9/25: Here for fu on the lower back pain; continued pain shooting into the right leg; Following pain management (Dr Andrade) and will be getting new MRI L-spine and MRI of the right knee.  She continues to take extra strength tylenol. Still with difficulty ambulating.  [] : no [FreeTextEntry5] : Here for a follow up of the lower back [de-identified] : physical therapy